# Patient Record
(demographics unavailable — no encounter records)

---

## 2025-06-01 NOTE — DISCUSSION/SUMMARY
[FreeTextEntry1] : The patient is a 44-year-old gentleman smoker, +FH, asthma ,HTN  #1 HTN- c/w losartan 100/12.5mg  #2 Asthma- stable off meds #3 General- smoking cessation, stress management, labs today

## 2025-06-01 NOTE — REVIEW OF SYSTEMS
[SOB] : no shortness of breath [Dyspnea on exertion] : not dyspnea during exertion [Chest Discomfort] : no chest discomfort [Lower Ext Edema] : no extremity edema [Negative] : Heme/Lymph